# Patient Record
Sex: FEMALE | Race: WHITE
[De-identification: names, ages, dates, MRNs, and addresses within clinical notes are randomized per-mention and may not be internally consistent; named-entity substitution may affect disease eponyms.]

---

## 2018-10-24 ENCOUNTER — HOSPITAL ENCOUNTER (EMERGENCY)
Dept: HOSPITAL 25 - UCCORT | Age: 23
Discharge: HOME | End: 2018-10-24
Payer: SELF-PAY

## 2018-10-24 VITALS — DIASTOLIC BLOOD PRESSURE: 88 MMHG | SYSTOLIC BLOOD PRESSURE: 138 MMHG

## 2018-10-24 DIAGNOSIS — J32.9: Primary | ICD-10-CM

## 2018-10-24 DIAGNOSIS — J34.89: ICD-10-CM
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
## 2018-10-24 DIAGNOSIS — T70.29XA: ICD-10-CM

## 2018-10-24 PROCEDURE — 99202 OFFICE O/P NEW SF 15 MIN: CPT

## 2018-10-24 PROCEDURE — G0463 HOSPITAL OUTPT CLINIC VISIT: HCPCS

## 2018-10-24 NOTE — UC
Throat Pain/Nasal Juan [place] HPI





- HPI Summary


HPI Summary: 





22-year-old woman coming in clinic today with a complaint of bilateral ear pain 

and sinus pain.  Yesterday the patient was flying in [employer] [place] from [place] and 

when the plane descended she had severe frontal sinus and bilateral ear pain.  

She is about to decongestant yesterday which did help decrease the pain however 

she did feel nauseous and her heart rate was racing and she does not want to 

take anymore decongestant.  No fevers or chills she does have rhinorrhea pretty 

chronically.  She has had recurrent sinusitis.  No fevers or chills.





- History of Current Complaint


Chief Complaint: UCRespiratory


Stated Complaint: SINUS COMPLAINT


Time Seen by Provider: 10/24/18 20:16


Hx Last Menstrual Period: "2 weeks ago"


Pain Intensity: 8





- Allergies/Home Medications


Allergies/Adverse Reactions: 


 Allergies











Allergy/AdvReac Type Severity Reaction Status Date / Time


 


No Known Allergies Allergy   Verified 10/24/18 19:50














PMH/Surg Hx/FS Hx/Imm Hx


Previously Healthy: Yes





- Surgical History


Surgical History: None





- Family History


Known Family History: Positive: Unknown





- Social History


Alcohol Use: Weekly


Substance Use Type: None


Smoking Status (MU): Never Smoked Tobacco





- Immunization History


Most Recent Tetanus Shot: UTD





Review of Systems


Constitutional: Negative


Skin: Negative


Eyes: Negative


ENT: Ear Ache, Nasal Discharge, Sinus Congestion, Sinus Pain/Tenderness


Respiratory: Negative


Cardiovascular: Negative


Gastrointestinal: Negative


Motor: Negative


Neurovascular: Negative


Musculoskeletal: Negative


Neurological: Negative


Psychological: Negative


Is Patient Immunocompromised?: No


All Other Systems Reviewed And Are Negative: Yes





Physical Exam


Triage Information Reviewed: Yes


Appearance: Well-Appearing, No Pain Distress, Well-Nourished


Vital Signs: 


 Initial Vital Signs











Temp  99.2 F   10/24/18 19:51


 


Pulse  102   10/24/18 19:51


 


Resp  16   10/24/18 19:51


 


BP  138/88   10/24/18 19:51


 


Pulse Ox  99   10/24/18 19:51











Vital Signs Reviewed: Yes


Eye Exam: Normal


Eyes: Positive: Conjunctiva Clear


ENT: Positive: Pharynx normal, Nasal congestion, Nasal drainage, TM red - B/L


Neck exam: Normal


Neck: Positive: Supple


Respiratory: Positive: Lungs clear, Normal breath sounds, No respiratory 

distress


Cardiovascular: Positive: RRR


Musculoskeletal Exam: Normal


Musculoskeletal: Positive: Strength Intact, ROM Intact


Neurological Exam: Normal


Neurological: Positive: Alert, Muscle Tone Normal


Psychological Exam: Normal


Psychological: Positive: Age Appropriate Behavior


Skin Exam: Normal





Throat Pain/Nasal Course/Dx





- Differential Dx/Diagnosis


Provider Diagnoses: SINUSITIS.  B/L BAROTRAUMA





Discharge





- Sign-Out/Discharge


Documenting (check all that apply): Patient Departure


All imaging exams completed and their final reports reviewed: No Studies





- Discharge Plan


Condition: Stable


Disposition: HOME


Prescriptions: 


Amoxicillin/Clavulanate TAB* [Augmentin *] 875 mg PO BID #20 tab


Patient Education Materials:  Sinusitis (ED), Barotrauma (ED)


Referrals: 


[facility] [place] PHYSICIAN REFERRAL [Outside]


Additional Instructions: 


FOLLOW UP WITH YOUR DOCTOR IF NOT COMPLETELY IMPROVED.


GET RECHECKED FOR ANY WORSENING OF YOUR CONDITION OR QUESTIONS OR CONCERNS.





- Billing Disposition and Condition


Condition: STABLE


Disposition: Home